# Patient Record
Sex: FEMALE | Race: BLACK OR AFRICAN AMERICAN | NOT HISPANIC OR LATINO
[De-identification: names, ages, dates, MRNs, and addresses within clinical notes are randomized per-mention and may not be internally consistent; named-entity substitution may affect disease eponyms.]

---

## 2017-03-20 PROBLEM — Z00.00 ENCOUNTER FOR PREVENTIVE HEALTH EXAMINATION: Status: ACTIVE | Noted: 2017-03-20

## 2020-08-22 DIAGNOSIS — Z01.818 ENCOUNTER FOR OTHER PREPROCEDURAL EXAMINATION: ICD-10-CM

## 2020-08-24 ENCOUNTER — APPOINTMENT (OUTPATIENT)
Dept: DISASTER EMERGENCY | Facility: CLINIC | Age: 65
End: 2020-08-24

## 2020-08-24 VITALS
SYSTOLIC BLOOD PRESSURE: 157 MMHG | WEIGHT: 166.89 LBS | TEMPERATURE: 98 F | RESPIRATION RATE: 16 BRPM | DIASTOLIC BLOOD PRESSURE: 70 MMHG | HEART RATE: 50 BPM | OXYGEN SATURATION: 100 % | HEIGHT: 62 IN

## 2020-08-24 NOTE — H&P ADULT - NSICDXPASTMEDICALHX_GEN_ALL_CORE_FT
PAST MEDICAL HISTORY:  DM (diabetes mellitus)     HLD (hyperlipidemia)     HTN (hypertension) PAST MEDICAL HISTORY:  HLD (hyperlipidemia)     HTN (hypertension)     Obstructive sleep apnea     Type 2 diabetes mellitus

## 2020-08-24 NOTE — H&P ADULT - NSHPLABSRESULTS_GEN_ALL_CORE
EKG: Sinus bradycardia 58bpm, TWI I, AVL, V4-V6 LABS:               12.4   5.95  )-----------( 176      ( 26 Aug 2020 10:41 )             38.7       08-26    141  |  102  |  13  ----------------------------<  103<H>  4.4   |  27  |  0.77    Ca    10.0      26 Aug 2020 10:19    TPro  9.0<H>  /  Alb  5.0  /  TBili  0.6  /  DBili  x   /  AST  26  /  ALT  25  /  AlkPhos  49  08-26      PT/INR - ( 26 Aug 2020 10:19 )   PT: 12.0 sec;   INR: 1.00          PTT - ( 26 Aug 2020 10:19 )  PTT:28.5 sec    CARDIAC MARKERS ( 26 Aug 2020 10:19 )  x     / x     / 377 U/L / x     / 4.1 ng/mL      EKG: Sinus bradycardia 58bpm, TWI I, AVL, V4-V6

## 2020-08-24 NOTE — H&P ADULT - HISTORY OF PRESENT ILLNESS
***SKELETON ****    COVID PCR ____(8/24/20 in [facility])    66yo Male with FHx of MI (Mother passed away at 60yo) and PMHx of HTN, HLD, DM-II who originally presented to his Cardiologist Dr. Maria c/o SOB x ___. The pt endorses ____ with associated _____. He denies any ___ CP, palpitations, dizziness, syncope, diaphoresis, fatigue, LE edema, SOB, LONG, orthopnea, PND, N/V/D, abd pain, cough, congestion, fever, chills or recent sick contact. The pt had a NST (7/24/20) revealing medium sized reversible defect of moderate intensity in the basal-mid inferior/inferolateral myocardial walls suggestive of inducible ischemia in the posterior distribution, EF 67%, Stress EKG revealed TWI 1.6-2mm in inferolateral leads.  In light of pts risk factors, CCS class __ anginal symptoms and abnormal NST, pt now presents to [facility] for recommended cardiac catheterization with possible intervention if clinically indicated. ***SKELETON ****    COVID PCR ____(8/24/20 in [facility])    66yo Female with FHx of MI (Mother passed away at 60yo) and PMHx of HTN, HLD, DM-II who originally presented to her Cardiologist Dr. Maria c/o SOB x ___. The pt endorses ____ with associated _____. She denies any ___ CP, palpitations, dizziness, syncope, diaphoresis, fatigue, LE edema, SOB, LONG, orthopnea, PND, N/V/D, abd pain, cough, congestion, fever, chills or recent sick contact. The pt had a NST (7/24/20) revealing medium sized reversible defect of moderate intensity in the basal-mid inferior/inferolateral myocardial walls suggestive of inducible ischemia in the posterior distribution, EF 67%, Stress EKG revealed TWI 1.6-2mm in inferolateral leads.  In light of pts risk factors, CCS class __ anginal symptoms and abnormal NST, pt now presents to [facility] for recommended cardiac catheterization with possible intervention if clinically indicated. COVID PCR ____(8/24/20 in [facility])    64yo Female with FHx of MI (Mother passed away at 60yo) and PMHx of HTN, HLD, DM-II and AVIVA (uses CPAP) who originally presented to her Cardiologist Dr. Maria c/o SOB x 2 weeks. The pt endorses worsening LONG when walk 4+ blocks with associated mild LE edema when it is hot outdoors. She denies any CP, palpitations, dizziness, syncope, diaphoresis, fatigue, orthopnea, PND, N/V/D, abd pain, cough, congestion, fever, chills or recent sick contact. The pt had a NST (7/24/20) revealing medium sized reversible defect of moderate intensity in the basal-mid inferior/inferolateral myocardial walls suggestive of inducible ischemia in the posterior distribution, EF 67%, Stress EKG revealed TWI 1.6-2mm in inferolateral leads.  In light of pts risk factors, CCS class III anginal symptoms and abnormal NST, pt now presents to [facility] for recommended cardiac catheterization with possible intervention if clinically indicated. COVID PCR negative (8/24/20 in [facility])    Cardiologist- Dr. Maria  Pharmacy- [facility] [phone] [facility] [id number]    66yo Female with FHx of MI (Mother passed away at 60yo) and PMHx of HTN, HLD, DM-II and AVIVA (uses CPAP) who originally presented to her Cardiologist Dr. Maria c/o SOB x 2 weeks. The pt endorses worsening LONG when walk 4+ blocks with associated mild LE edema when it is hot outdoors. She denies any CP, palpitations, dizziness, syncope, diaphoresis, fatigue, orthopnea, PND, N/V/D, abd pain, cough, congestion, fever, chills or recent sick contact. The pt had a NST (7/24/20) revealing medium sized reversible defect of moderate intensity in the basal-mid inferior/inferolateral myocardial walls suggestive of inducible ischemia in the posterior distribution, EF 67%, Stress EKG revealed TWI 1.6-2mm in inferolateral leads.  In light of pts risk factors, CCS class III anginal symptoms and abnormal NST, pt now presents to [facility] for recommended cardiac catheterization with possible intervention if clinically indicated.

## 2020-08-24 NOTE — H&P ADULT - ASSESSMENT
64yo Female with FHx of MI (Mother passed away at 62yo) and PMHx of HTN, HLD, DM-II and AVIAV (uses CPAP) who presents for cardiac catheterization in light of risk factors, CCS class III anginal symptoms and abnormal NST. 66yo Female with FHx of MI (Mother passed away at 62yo) and PMHx of HTN, HLD, DM-II and AVIVA (uses CPAP) who presents for cardiac catheterization in light of risk factors, CCS class III anginal symptoms and abnormal NST.    -ASA II, Mallampati IV  -suitable candidate for moderate   -load aspirin 325mg/plavix 600mg   -NS 75cc/hr    Risks & benefits of procedure and alternative therapy have been explained to the patient including but not limited to: allergic reaction, bleeding w/possible need for blood transfusion, infection, renal and vascular compromise, limb damage, arrhythmia, stroke, vessel dissection/perforation, Myocardial infarction, emergent CABG.

## 2020-08-25 LAB — SARS-COV-2 N GENE NPH QL NAA+PROBE: NOT DETECTED

## 2020-08-26 ENCOUNTER — OUTPATIENT (OUTPATIENT)
Dept: OUTPATIENT SERVICES | Facility: HOSPITAL | Age: 65
LOS: 1 days | Discharge: HOME | End: 2020-08-26
Payer: MEDICARE

## 2020-08-26 LAB
A1C WITH ESTIMATED AVERAGE GLUCOSE RESULT: 6.1 % — HIGH (ref 4–5.6)
ALBUMIN SERPL ELPH-MCNC: 5 G/DL — SIGNIFICANT CHANGE UP (ref 3.3–5)
ALP SERPL-CCNC: 49 U/L — SIGNIFICANT CHANGE UP (ref 40–120)
ALT FLD-CCNC: 25 U/L — SIGNIFICANT CHANGE UP (ref 10–45)
ANION GAP SERPL CALC-SCNC: 12 MMOL/L — SIGNIFICANT CHANGE UP (ref 5–17)
APTT BLD: 28.5 SEC — SIGNIFICANT CHANGE UP (ref 27.5–35.5)
AST SERPL-CCNC: 26 U/L — SIGNIFICANT CHANGE UP (ref 10–40)
BILIRUB SERPL-MCNC: 0.6 MG/DL — SIGNIFICANT CHANGE UP (ref 0.2–1.2)
BUN SERPL-MCNC: 13 MG/DL — SIGNIFICANT CHANGE UP (ref 7–23)
CALCIUM SERPL-MCNC: 10 MG/DL — SIGNIFICANT CHANGE UP (ref 8.4–10.5)
CHLORIDE SERPL-SCNC: 102 MMOL/L — SIGNIFICANT CHANGE UP (ref 96–108)
CHOLEST SERPL-MCNC: 207 MG/DL — HIGH (ref 10–199)
CK MB CFR SERPL CALC: 4.1 NG/ML — SIGNIFICANT CHANGE UP (ref 0–6.7)
CK SERPL-CCNC: 377 U/L — HIGH (ref 25–170)
CO2 SERPL-SCNC: 27 MMOL/L — SIGNIFICANT CHANGE UP (ref 22–31)
CREAT SERPL-MCNC: 0.77 MG/DL — SIGNIFICANT CHANGE UP (ref 0.5–1.3)
ESTIMATED AVERAGE GLUCOSE: 128 MG/DL — HIGH (ref 68–114)
GLUCOSE SERPL-MCNC: 103 MG/DL — HIGH (ref 70–99)
HDLC SERPL-MCNC: 69 MG/DL — SIGNIFICANT CHANGE UP
INR BLD: 1 — SIGNIFICANT CHANGE UP (ref 0.88–1.16)
LIPID PNL WITH DIRECT LDL SERPL: 117 MG/DL — HIGH
POTASSIUM SERPL-MCNC: 4.4 MMOL/L — SIGNIFICANT CHANGE UP (ref 3.5–5.3)
POTASSIUM SERPL-SCNC: 4.4 MMOL/L — SIGNIFICANT CHANGE UP (ref 3.5–5.3)
PROT SERPL-MCNC: 9 G/DL — HIGH (ref 6–8.3)
PROTHROM AB SERPL-ACNC: 12 SEC — SIGNIFICANT CHANGE UP (ref 10.6–13.6)
SODIUM SERPL-SCNC: 141 MMOL/L — SIGNIFICANT CHANGE UP (ref 135–145)
TOTAL CHOLESTEROL/HDL RATIO MEASUREMENT: 3 RATIO — LOW (ref 3.3–7.1)
TRIGL SERPL-MCNC: 103 MG/DL — SIGNIFICANT CHANGE UP (ref 10–149)

## 2020-08-26 PROCEDURE — 82553 CREATINE MB FRACTION: CPT

## 2020-08-26 PROCEDURE — 93458 L HRT ARTERY/VENTRICLE ANGIO: CPT

## 2020-08-26 PROCEDURE — C1769: CPT

## 2020-08-26 PROCEDURE — 82962 GLUCOSE BLOOD TEST: CPT

## 2020-08-26 PROCEDURE — 83036 HEMOGLOBIN GLYCOSYLATED A1C: CPT

## 2020-08-26 PROCEDURE — C1894: CPT

## 2020-08-26 PROCEDURE — 93005 ELECTROCARDIOGRAM TRACING: CPT

## 2020-08-26 PROCEDURE — 93010 ELECTROCARDIOGRAM REPORT: CPT

## 2020-08-26 PROCEDURE — C1887: CPT

## 2020-08-26 PROCEDURE — 36415 COLL VENOUS BLD VENIPUNCTURE: CPT

## 2020-08-26 PROCEDURE — 85610 PROTHROMBIN TIME: CPT

## 2020-08-26 PROCEDURE — 80053 COMPREHEN METABOLIC PANEL: CPT

## 2020-08-26 PROCEDURE — 85027 COMPLETE CBC AUTOMATED: CPT

## 2020-08-26 PROCEDURE — 80061 LIPID PANEL: CPT

## 2020-08-26 PROCEDURE — 82550 ASSAY OF CK (CPK): CPT

## 2020-08-26 PROCEDURE — 85730 THROMBOPLASTIN TIME PARTIAL: CPT

## 2020-08-26 RX ORDER — ASPIRIN/CALCIUM CARB/MAGNESIUM 324 MG
325 TABLET ORAL ONCE
Refills: 0 | Status: COMPLETED | OUTPATIENT
Start: 2020-08-26 | End: 2020-08-26

## 2020-08-26 RX ORDER — GLUCAGON INJECTION, SOLUTION 0.5 MG/.1ML
1 INJECTION, SOLUTION SUBCUTANEOUS ONCE
Refills: 0 | Status: DISCONTINUED | OUTPATIENT
Start: 2020-08-26 | End: 2020-09-10

## 2020-08-26 RX ORDER — SODIUM CHLORIDE 9 MG/ML
500 INJECTION INTRAMUSCULAR; INTRAVENOUS; SUBCUTANEOUS
Refills: 0 | Status: DISCONTINUED | OUTPATIENT
Start: 2020-08-26 | End: 2020-09-10

## 2020-08-26 RX ORDER — SODIUM CHLORIDE 9 MG/ML
1000 INJECTION, SOLUTION INTRAVENOUS
Refills: 0 | Status: DISCONTINUED | OUTPATIENT
Start: 2020-08-26 | End: 2020-09-10

## 2020-08-26 RX ORDER — DEXTROSE 50 % IN WATER 50 %
12.5 SYRINGE (ML) INTRAVENOUS ONCE
Refills: 0 | Status: DISCONTINUED | OUTPATIENT
Start: 2020-08-26 | End: 2020-09-10

## 2020-08-26 RX ORDER — CHLORHEXIDINE GLUCONATE 213 G/1000ML
1 SOLUTION TOPICAL ONCE
Refills: 0 | Status: DISCONTINUED | OUTPATIENT
Start: 2020-08-26 | End: 2020-08-26

## 2020-08-26 RX ORDER — CLOPIDOGREL BISULFATE 75 MG/1
600 TABLET, FILM COATED ORAL ONCE
Refills: 0 | Status: COMPLETED | OUTPATIENT
Start: 2020-08-26 | End: 2020-08-26

## 2020-08-26 RX ORDER — DEXTROSE 50 % IN WATER 50 %
25 SYRINGE (ML) INTRAVENOUS ONCE
Refills: 0 | Status: DISCONTINUED | OUTPATIENT
Start: 2020-08-26 | End: 2020-09-10

## 2020-08-26 RX ORDER — SODIUM CHLORIDE 9 MG/ML
500 INJECTION INTRAMUSCULAR; INTRAVENOUS; SUBCUTANEOUS
Refills: 0 | Status: DISCONTINUED | OUTPATIENT
Start: 2020-08-26 | End: 2020-08-26

## 2020-08-26 RX ORDER — INSULIN LISPRO 100/ML
VIAL (ML) SUBCUTANEOUS ONCE
Refills: 0 | Status: DISCONTINUED | OUTPATIENT
Start: 2020-08-26 | End: 2020-09-10

## 2020-08-26 RX ORDER — DEXTROSE 50 % IN WATER 50 %
15 SYRINGE (ML) INTRAVENOUS ONCE
Refills: 0 | Status: DISCONTINUED | OUTPATIENT
Start: 2020-08-26 | End: 2020-09-10

## 2020-08-26 RX ORDER — SODIUM CHLORIDE 9 MG/ML
1000 INJECTION INTRAMUSCULAR; INTRAVENOUS; SUBCUTANEOUS
Refills: 0 | Status: DISCONTINUED | OUTPATIENT
Start: 2020-08-26 | End: 2020-08-26

## 2020-08-26 RX ADMIN — CLOPIDOGREL BISULFATE 600 MILLIGRAM(S): 75 TABLET, FILM COATED ORAL at 11:07

## 2020-08-26 RX ADMIN — Medication 325 MILLIGRAM(S): at 11:07

## 2020-08-26 NOTE — PROGRESS NOTE ADULT - SUBJECTIVE AND OBJECTIVE BOX
Interventional Cardiology PA SDA Discharge Note    Patient without complaints. Ambulated and voided without difficulties    Afebrile, VSS    Ext:  Right Radial:   No hematoma, no bleeding, dressing; C/D/I  Pulses:    intact RAD/DP/PT to baseline     A/P:  64yo Female with FHx of MI (Mother passed away at 60yo) and PMHx of HTN, HLD, DM-II and AVIVA (uses CPAP) who originally presented to her Cardiologist Dr. Maria c/o SOB x 2 weeks. The pt endorses worsening LONG when walk 4+ blocks with associated mild LE edema when it is hot outdoors. She denies any CP, palpitations, dizziness, syncope, diaphoresis, fatigue, orthopnea, PND, N/V/D, abd pain, cough, congestion, fever, chills or recent sick contact. The pt had a NST (20) revealing medium sized reversible defect of moderate intensity in the basal-mid inferior/inferolateral myocardial walls suggestive of inducible ischemia in the posterior distribution, EF 67%, Stress EKG revealed TWI 1.6-2mm in inferolateral leads.  In light of pts risk factors, CCS class III anginal symptoms and abnormal NST, pt now presents to [facility] for recommended cardiac catheterization with possible intervention if clinically indicated.       Pt is s/p Diagnostic Cardiac 20 showin.	Stable for discharge as per attending Dr. Maria after bed rest, pt voids, groin/wrist stable and 30 minutes of ambulation.  2.	Follow-up with Cardiologist, Dr. Maria in 1-2 weeks  3.	Discharged forms signed and copies in chart Interventional Cardiology PA SDA Discharge Note    Patient without complaints. Ambulated and voided without difficulties    Afebrile, VSS    Ext:  Right Radial:   No hematoma, no bleeding, dressing; C/D/I  Pulses:    intact RAD/DP/PT to baseline     A/P:  66yo Female with FHx of MI (Mother passed away at 60yo) and PMHx of HTN, HLD, DM-II and AVIVA (uses CPAP) who originally presented to her Cardiologist Dr. Maria c/o SOB x 2 weeks. The pt endorses worsening LONG when walk 4+ blocks with associated mild LE edema when it is hot outdoors. She denies any CP, palpitations, dizziness, syncope, diaphoresis, fatigue, orthopnea, PND, N/V/D, abd pain, cough, congestion, fever, chills or recent sick contact. The pt had a NST (7/24/20) revealing medium sized reversible defect of moderate intensity in the basal-mid inferior/inferolateral myocardial walls suggestive of inducible ischemia in the posterior distribution, EF 67%, Stress EKG revealed TWI 1.6-2mm in inferolateral leads.  In light of pts risk factors, CCS class III anginal symptoms and abnormal NST, pt now presents to [facility] for recommended cardiac catheterization with possible intervention if clinically indicated.       Pt is s/p Diagnostic Cardiac 8/26/20 showing:  LM normal,  mLAD 30% lesion, LCx (minor luminal irreg), RCA, large dominant (minor luminal irreg), EVEF 55%, LVEDP 21. no AS. no MR. Pt is to hold Metformin for 2 days and restart on Friday, 8/28/20.  She is to continue all her other medications.    1.	Stable for discharge as per attending Dr. Maria after bed rest, pt voids, groin/wrist stable and 30 minutes of ambulation.  2.	Follow-up with Cardiologist, Dr. Maria in 1-2 weeks  3.	Discharged forms signed and copies in chart

## 2020-09-04 DIAGNOSIS — R94.39 ABNORMAL RESULT OF OTHER CARDIOVASCULAR FUNCTION STUDY: ICD-10-CM

## 2020-09-04 DIAGNOSIS — E78.5 HYPERLIPIDEMIA, UNSPECIFIED: ICD-10-CM

## 2020-09-04 DIAGNOSIS — Z82.49 FAMILY HISTORY OF ISCHEMIC HEART DISEASE AND OTHER DISEASES OF THE CIRCULATORY SYSTEM: ICD-10-CM

## 2020-09-04 DIAGNOSIS — E11.9 TYPE 2 DIABETES MELLITUS WITHOUT COMPLICATIONS: ICD-10-CM

## 2020-09-04 DIAGNOSIS — I25.110 ATHEROSCLEROTIC HEART DISEASE OF NATIVE CORONARY ARTERY WITH UNSTABLE ANGINA PECTORIS: ICD-10-CM

## 2020-09-04 DIAGNOSIS — Z79.84 LONG TERM (CURRENT) USE OF ORAL HYPOGLYCEMIC DRUGS: ICD-10-CM

## 2020-09-04 DIAGNOSIS — I10 ESSENTIAL (PRIMARY) HYPERTENSION: ICD-10-CM

## 2021-10-06 ENCOUNTER — NON-APPOINTMENT (OUTPATIENT)
Age: 66
End: 2021-10-06

## 2021-10-06 DIAGNOSIS — K31.89 OTHER DISEASES OF STOMACH AND DUODENUM: ICD-10-CM

## 2021-12-03 PROBLEM — E78.5 HYPERLIPIDEMIA, UNSPECIFIED: Chronic | Status: ACTIVE | Noted: 2020-08-24

## 2021-12-03 PROBLEM — I10 ESSENTIAL (PRIMARY) HYPERTENSION: Chronic | Status: ACTIVE | Noted: 2020-08-24

## 2021-12-03 PROBLEM — G47.33 OBSTRUCTIVE SLEEP APNEA (ADULT) (PEDIATRIC): Chronic | Status: ACTIVE | Noted: 2020-08-26

## 2021-12-03 PROBLEM — E11.9 TYPE 2 DIABETES MELLITUS WITHOUT COMPLICATIONS: Chronic | Status: ACTIVE | Noted: 2020-08-26

## 2022-01-04 ENCOUNTER — OUTPATIENT (OUTPATIENT)
Dept: OUTPATIENT SERVICES | Facility: HOSPITAL | Age: 67
LOS: 1 days | End: 2022-01-04
Payer: MEDICARE

## 2022-01-04 VITALS
DIASTOLIC BLOOD PRESSURE: 79 MMHG | SYSTOLIC BLOOD PRESSURE: 143 MMHG | RESPIRATION RATE: 16 BRPM | WEIGHT: 162.92 LBS | HEIGHT: 62.5 IN | OXYGEN SATURATION: 99 % | TEMPERATURE: 98 F | HEART RATE: 97 BPM

## 2022-01-04 DIAGNOSIS — K31.89 OTHER DISEASES OF STOMACH AND DUODENUM: ICD-10-CM

## 2022-01-04 DIAGNOSIS — E11.9 TYPE 2 DIABETES MELLITUS WITHOUT COMPLICATIONS: ICD-10-CM

## 2022-01-04 LAB
A1C WITH ESTIMATED AVERAGE GLUCOSE RESULT: 6 % — HIGH (ref 4–5.6)
ALBUMIN SERPL ELPH-MCNC: 4.4 G/DL — SIGNIFICANT CHANGE UP (ref 3.3–5)
ALP SERPL-CCNC: 49 U/L — SIGNIFICANT CHANGE UP (ref 40–120)
ALT FLD-CCNC: 14 U/L — SIGNIFICANT CHANGE UP (ref 4–33)
ANION GAP SERPL CALC-SCNC: 13 MMOL/L — SIGNIFICANT CHANGE UP (ref 7–14)
AST SERPL-CCNC: 18 U/L — SIGNIFICANT CHANGE UP (ref 4–32)
BILIRUB SERPL-MCNC: 0.3 MG/DL — SIGNIFICANT CHANGE UP (ref 0.2–1.2)
BUN SERPL-MCNC: 12 MG/DL — SIGNIFICANT CHANGE UP (ref 7–23)
CALCIUM SERPL-MCNC: 9.5 MG/DL — SIGNIFICANT CHANGE UP (ref 8.4–10.5)
CHLORIDE SERPL-SCNC: 100 MMOL/L — SIGNIFICANT CHANGE UP (ref 98–107)
CO2 SERPL-SCNC: 26 MMOL/L — SIGNIFICANT CHANGE UP (ref 22–31)
CREAT SERPL-MCNC: 0.73 MG/DL — SIGNIFICANT CHANGE UP (ref 0.5–1.3)
ESTIMATED AVERAGE GLUCOSE: 126 — SIGNIFICANT CHANGE UP
GLUCOSE SERPL-MCNC: 112 MG/DL — HIGH (ref 70–99)
HCT VFR BLD CALC: 38 % — SIGNIFICANT CHANGE UP (ref 34.5–45)
HGB BLD-MCNC: 12.3 G/DL — SIGNIFICANT CHANGE UP (ref 11.5–15.5)
MCHC RBC-ENTMCNC: 27.5 PG — SIGNIFICANT CHANGE UP (ref 27–34)
MCHC RBC-ENTMCNC: 32.4 GM/DL — SIGNIFICANT CHANGE UP (ref 32–36)
MCV RBC AUTO: 85 FL — SIGNIFICANT CHANGE UP (ref 80–100)
NRBC # BLD: 0 /100 WBCS — SIGNIFICANT CHANGE UP
NRBC # FLD: 0 K/UL — SIGNIFICANT CHANGE UP
PLATELET # BLD AUTO: 194 K/UL — SIGNIFICANT CHANGE UP (ref 150–400)
POTASSIUM SERPL-MCNC: 4 MMOL/L — SIGNIFICANT CHANGE UP (ref 3.5–5.3)
POTASSIUM SERPL-SCNC: 4 MMOL/L — SIGNIFICANT CHANGE UP (ref 3.5–5.3)
PROT SERPL-MCNC: 7.4 G/DL — SIGNIFICANT CHANGE UP (ref 6–8.3)
RBC # BLD: 4.47 M/UL — SIGNIFICANT CHANGE UP (ref 3.8–5.2)
RBC # FLD: 13.4 % — SIGNIFICANT CHANGE UP (ref 10.3–14.5)
SODIUM SERPL-SCNC: 139 MMOL/L — SIGNIFICANT CHANGE UP (ref 135–145)
WBC # BLD: 5.51 K/UL — SIGNIFICANT CHANGE UP (ref 3.8–10.5)
WBC # FLD AUTO: 5.51 K/UL — SIGNIFICANT CHANGE UP (ref 3.8–10.5)

## 2022-01-04 PROCEDURE — 93010 ELECTROCARDIOGRAM REPORT: CPT

## 2022-01-04 RX ORDER — GLIMEPIRIDE 1 MG
1 TABLET ORAL
Qty: 0 | Refills: 0 | DISCHARGE

## 2022-01-04 RX ORDER — METFORMIN HYDROCHLORIDE 850 MG/1
1 TABLET ORAL
Qty: 0 | Refills: 0 | DISCHARGE

## 2022-01-04 RX ORDER — FAMOTIDINE 10 MG/ML
1 INJECTION INTRAVENOUS
Qty: 0 | Refills: 0 | DISCHARGE

## 2022-01-04 RX ORDER — LOSARTAN POTASSIUM 100 MG/1
1 TABLET, FILM COATED ORAL
Qty: 0 | Refills: 0 | DISCHARGE

## 2022-01-04 NOTE — H&P PST ADULT - PROBLEM SELECTOR PLAN 1
Pt. is scheduled for a an upper endoscopic ultrasound 1/18/22.  Pt. verbalized understanding of instructions.  Pt. was instructed by the Surgeon to schedule medical clearance.  Will request it also be faxed to PST due to extensive medical hx.

## 2022-01-04 NOTE — H&P PST ADULT - PROBLEM SELECTOR PLAN 2
Pt.  instructed to take last dose of Glimepiride and Metformin in the morning the day before surgery.

## 2022-01-04 NOTE — H&P PST ADULT - NSICDXPASTMEDICALHX_GEN_ALL_CORE_FT
PAST MEDICAL HISTORY:  HLD (hyperlipidemia)     HTN (hypertension)     Obstructive sleep apnea     Type 2 diabetes mellitus      PAST MEDICAL HISTORY:  History of 1 spontaneous      HLD (hyperlipidemia)     HTN (hypertension)     Obstructive sleep apnea     Type 2 diabetes mellitus      PAST MEDICAL HISTORY:  History of 1 spontaneous      HLD (hyperlipidemia)     HTN (hypertension)     Obese     Obstructive sleep apnea     Type 2 diabetes mellitus

## 2022-01-04 NOTE — H&P PST ADULT - REASON FOR ADMISSION
" "endoscopy because I had it in September and had a little herni...when I eat something spicy it's painful"

## 2022-01-04 NOTE — H&P PST ADULT - NSICDXFAMILYHX_GEN_ALL_CORE_FT
FAMILY HISTORY:  FHx: myocardial infarction, Mother passed away at 62yo    
PAST MEDICAL HISTORY:  Asthma     Back pain     Hyperlipidemia     Hypertension     Insomnia     Knee pain, right     Morbid obesity     Myocardial infarction 2009    Osteoarthritis     Pain management     Right bundle branch block     Sleep apnea resolved with gastric sleeve surgery    Spinal stenosis

## 2022-01-16 LAB — SARS-COV-2 N GENE NPH QL NAA+PROBE: DETECTED

## 2022-02-08 ENCOUNTER — APPOINTMENT (OUTPATIENT)
Dept: GASTROENTEROLOGY | Facility: HOSPITAL | Age: 67
End: 2022-02-08

## 2022-02-08 ENCOUNTER — OUTPATIENT (OUTPATIENT)
Dept: OUTPATIENT SERVICES | Facility: HOSPITAL | Age: 67
LOS: 1 days | Discharge: HOME | End: 2022-02-08
Payer: MEDICARE

## 2022-02-08 ENCOUNTER — NON-APPOINTMENT (OUTPATIENT)
Age: 67
End: 2022-02-08

## 2022-02-08 VITALS
DIASTOLIC BLOOD PRESSURE: 67 MMHG | OXYGEN SATURATION: 100 % | RESPIRATION RATE: 19 BRPM | HEART RATE: 60 BPM | SYSTOLIC BLOOD PRESSURE: 131 MMHG

## 2022-02-08 VITALS
SYSTOLIC BLOOD PRESSURE: 123 MMHG | DIASTOLIC BLOOD PRESSURE: 60 MMHG | HEIGHT: 62 IN | WEIGHT: 162.04 LBS | OXYGEN SATURATION: 100 % | HEART RATE: 60 BPM | RESPIRATION RATE: 15 BRPM | TEMPERATURE: 98 F

## 2022-02-08 DIAGNOSIS — K31.89 OTHER DISEASES OF STOMACH AND DUODENUM: ICD-10-CM

## 2022-02-08 PROCEDURE — 43259 EGD US EXAM DUODENUM/JEJUNUM: CPT | Mod: GC

## 2025-02-04 NOTE — H&P PST ADULT - PRETERM DELIVERIES, OB PROFILE
Catie called in and stated she is having dental surgery soon and the office needs proof of pysical and lab.  She requested this information be faxed to them.  
1

## (undated) DEVICE — BITE BLOCK ADULT 20 X 27MM (GREEN)

## (undated) DEVICE — DRSG BANDAID 0.75X3"

## (undated) DEVICE — TUBING IV SET GRAVITY 3Y 100" MACRO

## (undated) DEVICE — BIOPSY FORCEP COLD DISP

## (undated) DEVICE — DRSG CURITY GAUZE SPONGE 4 X 4" 12-PLY NON-STERILE

## (undated) DEVICE — LINE BREATHE SAMPLNG

## (undated) DEVICE — GOWN LG

## (undated) DEVICE — SALIVA EJECTOR (BLUE)

## (undated) DEVICE — DRSG 2X2

## (undated) DEVICE — TUBING MEDI-VAC W MAXIGRIP CONNECTORS 1/4"X6'

## (undated) DEVICE — DENTURE CUP PINK

## (undated) DEVICE — CATH IV SAFE BC 22G X 1" (BLUE)

## (undated) DEVICE — UNDERPAD LINEN SAVER 17 X 24"

## (undated) DEVICE — LUBRICATING JELLY HR ONE SHOT 3G

## (undated) DEVICE — ELCTR ECG CONDUCTIVE ADHESIVE

## (undated) DEVICE — BASIN EMESIS 10IN GRADUATED MAUVE

## (undated) DEVICE — BIOPSY FORCEP RADIAL JAW 4 STANDARD WITH NEEDLE

## (undated) DEVICE — CONTAINER FORMALIN 80ML YELLOW

## (undated) DEVICE — CLAMP BX HOT RAD JAW 3

## (undated) DEVICE — PACK IV START WITH CHG

## (undated) DEVICE — CATH BLLN ULTRASONIC ENSOSCOPE

## (undated) DEVICE — FACESHIELD FULL VISOR